# Patient Record
Sex: MALE | Race: OTHER | HISPANIC OR LATINO | URBAN - METROPOLITAN AREA
[De-identification: names, ages, dates, MRNs, and addresses within clinical notes are randomized per-mention and may not be internally consistent; named-entity substitution may affect disease eponyms.]

---

## 2023-04-29 ENCOUNTER — EMERGENCY (EMERGENCY)
Facility: HOSPITAL | Age: 43
LOS: 1 days | Discharge: HOME | End: 2023-04-29
Attending: STUDENT IN AN ORGANIZED HEALTH CARE EDUCATION/TRAINING PROGRAM
Payer: COMMERCIAL

## 2023-04-29 VITALS
SYSTOLIC BLOOD PRESSURE: 135 MMHG | OXYGEN SATURATION: 99 % | HEIGHT: 67 IN | WEIGHT: 176.37 LBS | HEART RATE: 82 BPM | RESPIRATION RATE: 18 BRPM | TEMPERATURE: 98 F | DIASTOLIC BLOOD PRESSURE: 93 MMHG

## 2023-04-29 LAB
BASOPHILS # BLD AUTO: 0.03 K/UL — SIGNIFICANT CHANGE UP (ref 0–0.2)
BASOPHILS NFR BLD AUTO: 0.5 % — SIGNIFICANT CHANGE UP (ref 0–2)
EOSINOPHIL # BLD AUTO: 0.15 K/UL — SIGNIFICANT CHANGE UP (ref 0–0.5)
EOSINOPHIL NFR BLD AUTO: 2.3 % — SIGNIFICANT CHANGE UP (ref 0–6)
HCT VFR BLD CALC: 40.3 % — SIGNIFICANT CHANGE UP (ref 39–50)
HGB BLD-MCNC: 14 G/DL — SIGNIFICANT CHANGE UP (ref 13–17)
IMM GRANULOCYTES NFR BLD AUTO: 0.6 % — SIGNIFICANT CHANGE UP (ref 0–0.9)
LYMPHOCYTES # BLD AUTO: 2.4 K/UL — SIGNIFICANT CHANGE UP (ref 1–3.3)
LYMPHOCYTES # BLD AUTO: 36.6 % — SIGNIFICANT CHANGE UP (ref 13–44)
MCHC RBC-ENTMCNC: 30.4 PG — SIGNIFICANT CHANGE UP (ref 27–34)
MCHC RBC-ENTMCNC: 34.7 GM/DL — SIGNIFICANT CHANGE UP (ref 32–36)
MCV RBC AUTO: 87.4 FL — SIGNIFICANT CHANGE UP (ref 80–100)
MONOCYTES # BLD AUTO: 0.56 K/UL — SIGNIFICANT CHANGE UP (ref 0–0.9)
MONOCYTES NFR BLD AUTO: 8.5 % — SIGNIFICANT CHANGE UP (ref 2–14)
NEUTROPHILS # BLD AUTO: 3.38 K/UL — SIGNIFICANT CHANGE UP (ref 1.8–7.4)
NEUTROPHILS NFR BLD AUTO: 51.5 % — SIGNIFICANT CHANGE UP (ref 43–77)
PLATELET # BLD AUTO: 358 K/UL — SIGNIFICANT CHANGE UP (ref 150–400)
RBC # BLD: 4.61 M/UL — SIGNIFICANT CHANGE UP (ref 4.2–5.8)
RBC # FLD: 12 % — SIGNIFICANT CHANGE UP (ref 10.3–14.5)
WBC # BLD: 6.56 K/UL — SIGNIFICANT CHANGE UP (ref 3.8–10.5)
WBC # FLD AUTO: 6.56 K/UL — SIGNIFICANT CHANGE UP (ref 3.8–10.5)

## 2023-04-29 PROCEDURE — 93010 ELECTROCARDIOGRAM REPORT: CPT

## 2023-04-29 PROCEDURE — 99284 EMERGENCY DEPT VISIT MOD MDM: CPT

## 2023-04-29 RX ORDER — SODIUM CHLORIDE 9 MG/ML
1000 INJECTION INTRAMUSCULAR; INTRAVENOUS; SUBCUTANEOUS ONCE
Refills: 0 | Status: COMPLETED | OUTPATIENT
Start: 2023-04-29 | End: 2023-04-29

## 2023-04-29 RX ORDER — FAMOTIDINE 10 MG/ML
20 INJECTION INTRAVENOUS ONCE
Refills: 0 | Status: COMPLETED | OUTPATIENT
Start: 2023-04-29 | End: 2023-04-29

## 2023-04-29 RX ADMIN — SODIUM CHLORIDE 1000 MILLILITER(S): 9 INJECTION INTRAMUSCULAR; INTRAVENOUS; SUBCUTANEOUS at 22:46

## 2023-04-29 RX ADMIN — Medication 30 MILLILITER(S): at 22:46

## 2023-04-29 RX ADMIN — FAMOTIDINE 20 MILLIGRAM(S): 10 INJECTION INTRAVENOUS at 22:46

## 2023-04-29 NOTE — ED ADULT NURSE NOTE - OBJECTIVE STATEMENT
Pt presents to ED c/o epigastric pain for past several days. Pt states pain radiates to abdomen and groin and comes on after eating. Pt c/o occasional nausea and diarrhea. Pt reports his grandfather passed away from stomach and wants to come in for further evaluation of this pain.

## 2023-04-29 NOTE — ED ADULT TRIAGE NOTE - CHIEF COMPLAINT QUOTE
c/o of epigastric pain that goes to his lower abd to groin x a few days, c/o of diarrhea and not eating anything it hurts

## 2023-04-29 NOTE — ED PROVIDER NOTE - PHYSICAL EXAMINATION
General-alert and oriented to person place and time, nontoxic appearing, pleasant cooperative, NAD  HEENT-normocephalic, atraumatic, NT to palp, EOMI, PERRLA, no conjunctival injections,   Cardio-s1,s2 present, regular rate and rhythm  Resp- talks in full sentences, symmetrical chest rise, CTA bilat, no evidence of wheezes, rhonchi noted  Abdomen- bowel sounds presnt in all 4 quadrants, soft, NT/ND, no guarding, no rebound tenderness  MSK- moves all extremities, able to ambulate without issues  Back- nt to palp of cervical, thoracic, lumbar spine, nt to palp of paraspinal m., No CVA tenderness  Neuro- no focal deficits, sensation intact

## 2023-04-29 NOTE — ED ADULT NURSE NOTE - CHIEF COMPLAINT
The patient is a 42y Male complaining of abdominal pain. Solaraze Pregnancy And Lactation Text: This medication is Pregnancy Category B and is considered safe. There is some data to suggest avoiding during the third trimester. It is unknown if this medication is excreted in breast milk.

## 2023-04-29 NOTE — ED PROVIDER NOTE - CARE PROVIDER_API CALL
Michelle Swann (DO)  Gastroenterology  [address], [address]  [place] [address]  Phone: [phone]  Fax: [phone]  Follow Up Time:

## 2023-04-29 NOTE — ED PROVIDER NOTE - PATIENT PORTAL LINK FT
You can access the FollowMyHealth Patient Portal offered by [facility] by registering at the following website: [email or web address]. By joining [facility]’s FollowMyHealth portal, you will also be able to view your health information using other applications (apps) compatible with our system.

## 2023-04-29 NOTE — ED PROVIDER NOTE - NS ED ATTENDING STATEMENT MOD
This was a shared visit with the COY. I reviewed and verified the documentation and independently performed the documented:

## 2023-04-29 NOTE — ED PROVIDER NOTE - OBJECTIVE STATEMENT
43 y/o male with no sign medical history presents to the ED c/o epigastric pain for the past 6 months worsening tonight. Pt notes after eating he would experience the epigastric pain. Notes worsening that he is afraid to eat due to the pain. Pain would radiate down his stomach at times. No nausea, vomiting. fevers, chills.

## 2023-04-29 NOTE — ED PROVIDER NOTE - CLINICAL SUMMARY MEDICAL DECISION MAKING FREE TEXT BOX
43 y/o male with no sign medical history presents to the ED c/o epigastric pain for the past 6 months worsening tonight. 43 y/o male with no sign medical history presents to the ED c/o epigastric pain for the past 6 months worsening tonight. labs stable, feeling better after meds, Pt reassessed, pt feeling better at this time, vss, pt able to walk, talk and vocalized plan of action. Discussed in depth and explained to pt in depth the next steps that need to be taking including proper follow up with PCP or specialists. All incidental findings were discussed with pt as well. Pt verbalized their concerns and all questions were answered. Pt understands dispo and wants discharge. Given good instructions when to return to ED and importance of f/u.

## 2023-04-30 LAB
ALBUMIN SERPL ELPH-MCNC: 4.5 G/DL — SIGNIFICANT CHANGE UP (ref 3.3–5.2)
ALP SERPL-CCNC: 91 U/L — SIGNIFICANT CHANGE UP (ref 40–120)
ALT FLD-CCNC: 14 U/L — SIGNIFICANT CHANGE UP
ANION GAP SERPL CALC-SCNC: 12 MMOL/L — SIGNIFICANT CHANGE UP (ref 5–17)
AST SERPL-CCNC: 18 U/L — SIGNIFICANT CHANGE UP
BILIRUB SERPL-MCNC: 0.5 MG/DL — SIGNIFICANT CHANGE UP (ref 0.4–2)
BUN SERPL-MCNC: 18.6 MG/DL — SIGNIFICANT CHANGE UP (ref 8–20)
CALCIUM SERPL-MCNC: 9.2 MG/DL — SIGNIFICANT CHANGE UP (ref 8.4–10.5)
CHLORIDE SERPL-SCNC: 104 MMOL/L — SIGNIFICANT CHANGE UP (ref 96–108)
CO2 SERPL-SCNC: 25 MMOL/L — SIGNIFICANT CHANGE UP (ref 22–29)
CREAT SERPL-MCNC: 1 MG/DL — SIGNIFICANT CHANGE UP (ref 0.5–1.3)
EGFR: 98 ML/MIN/1.73M2 — SIGNIFICANT CHANGE UP
GLUCOSE SERPL-MCNC: 78 MG/DL — SIGNIFICANT CHANGE UP (ref 70–99)
LIDOCAIN IGE QN: 37 U/L — SIGNIFICANT CHANGE UP (ref 22–51)
POTASSIUM SERPL-MCNC: 3.9 MMOL/L — SIGNIFICANT CHANGE UP (ref 3.5–5.3)
POTASSIUM SERPL-SCNC: 3.9 MMOL/L — SIGNIFICANT CHANGE UP (ref 3.5–5.3)
PROT SERPL-MCNC: 7.2 G/DL — SIGNIFICANT CHANGE UP (ref 6.6–8.7)
SODIUM SERPL-SCNC: 141 MMOL/L — SIGNIFICANT CHANGE UP (ref 135–145)

## 2023-04-30 PROCEDURE — 85025 COMPLETE CBC W/AUTO DIFF WBC: CPT

## 2023-04-30 PROCEDURE — 96361 HYDRATE IV INFUSION ADD-ON: CPT

## 2023-04-30 PROCEDURE — 99284 EMERGENCY DEPT VISIT MOD MDM: CPT | Mod: 25

## 2023-04-30 PROCEDURE — T1013: CPT

## 2023-04-30 PROCEDURE — 96374 THER/PROPH/DIAG INJ IV PUSH: CPT

## 2023-04-30 PROCEDURE — 93005 ELECTROCARDIOGRAM TRACING: CPT

## 2023-04-30 PROCEDURE — 80053 COMPREHEN METABOLIC PANEL: CPT

## 2023-04-30 PROCEDURE — 36415 COLL VENOUS BLD VENIPUNCTURE: CPT

## 2023-04-30 PROCEDURE — 83690 ASSAY OF LIPASE: CPT

## 2023-04-30 RX ORDER — PANTOPRAZOLE SODIUM 20 MG/1
1 TABLET, DELAYED RELEASE ORAL
Qty: 14 | Refills: 0
Start: 2023-04-30 | End: 2023-05-13

## 2024-06-12 NOTE — ED ADULT TRIAGE NOTE - GLASGOW COMA SCALE: SCORE, MLM
Talar Dome Lesion        What Is a Talar Dome Lesion?     The ankle joint is composed of the bottom of the tibia (shin) bone and the top of the talus (ankle) bone. The top of the talus is dome-shaped and is completely covered with cartilage--a tough, rubbery tissue that enables the ankle to move smoothly. A talar dome lesion is an injury to the cartilage and underlying bone of the talus within the ankle joint. It is also called an osteochondral defect (OCD) or osteochondral lesion of the talus (OLT). Osteo means bone and chondral refers to cartilage.    Talar dome lesions are usually caused by an injury, such as an ankle sprain. If the cartilage does not heal properly following the injury, it softens and begins to break off. Sometimes a broken piece of the damaged cartilage and bone will float in the ankle.    Signs & Symptoms  Unless the injury is extensive, it may take months, a year or even longer for symptoms to develop. The signs and symptoms of a talar dome lesion may include:    Chronic pain deep in the ankle--typically worse when bearing weight on the foot (especially during sports) and less when resting  An occasional clicking or catching feeling in the ankle when walking  A sensation of the ankle locking or giving out  Episodes of swelling of the ankle--occurring when bearing weight and subsiding when at rest     Diagnosis  A talar dome lesion can be difficult to diagnose because the precise site of the pain can be hard to pinpoint. To diagnose this injury, the foot and ankle surgeon will question the patient about recent or previous injury and will examine the foot and ankle, moving the ankle joint to help determine if there is pain, clicking or limited motion within that joint.    Sometimes the surgeon will inject the joint with an anesthetic (pain-relieving medication) to see if the pain goes away for a while, indicating that the pain is coming from inside the joint. X-rays are taken, and  often an MRI or other advanced imaging tests are ordered to further evaluate the lesion and extent of the injury.    Nonsurgical Treatment Approaches  Treatment depends on the severity of the talar dome lesion. If the lesion is stable (without loose pieces of cartilage or bone), one or more of the following nonsurgical treatment options may be considered:    Immobilization. Depending on the type of injury, the leg may be placed in a cast or cast boot to protect the talus. During this period of immobilization, nonweightbearing range-of-motion exercises may be recommended.  Oral medications. Nonsteroidal anti-inflammatory drugs (NSAIDs), such as ibuprofen, may be helpful in reducing the pain and inflammation.  Physical therapy. Range-of-motion and strengthening exercises are beneficial once the lesion is adequately healed. Physical therapy may also include techniques to reduce pain and swelling.  Ankle brace. Wearing an ankle brace may help protect the patient from reinjury if the ankle is unstable.     When Is Surgery Needed?  If nonsurgical treatment fails to relieve the symptoms of talar dome lesions, surgery may be necessary. Surgery may involve removal of the loose bone and cartilage fragments within the joint and establishing an environment for healing. A variety of surgical techniques is available to accomplish this. The surgeon will select the best procedure based on the specific case.    Complications of Talar Dome Lesions  Depending on the amount of damage to the cartilage in the ankle joint, arthritis may develop in the joint, resulting in chronic pain, swelling and limited joint motion. Treatment for these complications is best directed by a foot and ankle surgeon and may include one or more of the following:    Nonsteroidal or steroidal anti-inflammatory medications  Physical therapy  Bracing  Surgical intervention      Joint Pain in the Foot  The foot contains 26 bones and more than 30 joints. Many  people experience pain involving one or more of these joints. The pain may be accompanied by swelling, tenderness, stiffness, redness, bruising and/or increased warmth over the affected joints.    Joint pain may be caused by trauma, infection, inflammation, arthritis, bursitis, gout or structural foot problems. It is initially treated with rest, elevation and limitation of walking/weightbearing on the painful foot. Use of nonsteroidal anti-inflammatory drugs (NSAIDs), such as ibuprofen, and ice can help reduce local inflammation and pain. Custom orothotic devices may also be prescribed to support the foot and reduce pain. A foot and ankle surgeon can best determine the cause of joint pain and recommend the appropriate treatment.      Joint Swelling in the Foot  The foot contains 26 bones and more than 30 joints. The body's natural response to any type of joint injury is to increase blood flow to the affected area. This results in an accumulation of fluid in the tissues in and around the joint, resulting in swelling. Depending on the cause of the injury, joint swelling may be accompanied by stiffness, redness, warmth and pain.    Joint swelling and pain can also be referred to as capsulitis, bursitis or synovitis.    Joint swelling may also result from inflammatory, degenerative, traumatic, infective or crystal-forming joint diseases, such as gout. If joint swelling persists, a foot and ankle surgeon can best determine the cause and recommend the appropriate treatment.    Synovitis  Synovitis is an inflammation of the tissues that line a joint. It is commonly associated with specific diseases, such as arthritis or gout, but it may also be the result of overuse or trauma. Symptoms of synovitis may include redness, swelling, warmth and pain with joint motion.    Evaluation by a foot and ankle surgeon will help confirm the diagnosis and will help rule out other possible concerns, such as fractures or infections. The  surgeon may sample fluid from the joint to analyze for inflammatory cells or may order x-rays or other advanced imaging tests to better evaluate the affected joint.    Treatment for synovitis includes rest, ice, immobilization and oral nonsteroidal anti-inflammatory drugs (NSAIDs), such as ibuprofen, and may include steroid injections into the joint. Surgery may be indicated in longstanding cases.      What Is Capsulitis    Ligaments surrounding the joint form a capsule, which helps the joint to function properly. Capsulitis is a condition in which these ligaments have become inflamed. Capsulitis can also occur in the most any joint in the foot and ankle. This inflammation causes considerable discomfort and, if left untreated, can eventually lead to a weakening of surrounding ligaments that can cause dislocation.     Symptoms  Because capsulitis can be a progressive disorder and usually worsens if left untreated, early recognition and treatment are important. In the earlier stages--the best time to seek treatment--the symptoms may include:    Pain  Swelling in the area of pain  Difficulty wearing shoes  Pain when walking barefoot     In more advanced stages, the supportive ligaments weaken, leading to failure of the joint to stabilize. The unstable joint can lead to other problems, such as repetitive injury to the cartilage of a joint.    Diagnosis  In arriving at a diagnosis, the foot and ankle surgeon will examine the foot, press on it and maneuver it to reproduce the symptoms. The surgeon will also look for potential causes and test the stability of the joint. X-rays are usually ordered, and other imaging studies are sometimes needed.    Nonsurgical Treatment  The best time to treat capsulitis is during the early stages. At that time, nonsurgical approaches can be used to stabilize the joint, reduce the symptoms and address the underlying cause of the condition.    The foot and ankle surgeon may select one or  more of the following options for early treatment of capsulitis:    Rest and ice. Staying off the foot and applying ice packs help reduce the swelling and pain. Apply an ice pack, placing a thin towel between the ice and the skin. Use ice for 20 minutes and then wait at least 40 minutes before icing again.  Oral medications. Nonsteroidal anti-inflammatory drugs (NSAIDs), such as ibuprofen, may help relieve the pain and inflammation.  Taping/splinting. It may be necessary to tape the toe so that it will stay in the correct position. This helps relieve the pain and prevent further drifting of the toe.  Stretching. Stretching exercises may be prescribed for patients who have tight calf muscles.  Shoe modifications. Supportive shoes with stiff soles are recommended because they control the motion and lessen the amount of pressure on the ball of the foot.  Orthotic devices. Custom shoe inserts are often very beneficial. These include arch supports or a metatarsal pad that distributes the weight away from the joint.     When Is Surgery Needed?  Once the second toe starts moving toward the big toe, it will never go back to its normal position unless surgery is performed. The foot and ankle surgeon will select the procedure or combination of procedures best suited to the individual patient.    Nonsurgical Treatment  When you have joint inflammation or swelling, rehabilitation is crucial--and it starts the moment your treatment begins. Your foot and ankle surgeon may recommend one or more of the following treatment options:      -Rest. Stay off the injured ankle. Walking may cause further injury.  -Ice. Apply an ice pack to the injured area, placing a thin towel between the ice and the skin. Use ice for 20 minutes and then wait at least 40 minutes before icing again.  -Compression. An elastic wrap may be recommended to control swelling.  -Elevation. The ankle should be raised slightly above the level of your heart to reduce  swelling.  -Early physical therapy. Your doctor will start you on a rehabilitation program as soon as possible to promote healing and increase your range of motion. This includes doing prescribed exercises.  -Medications. Nonsteroidal anti-inflammatory drugs (NSAIDs), such as ibuprofen, may be recommended to reduce pain and inflammation. In some cases, prescription pain medications are needed to provide adequate relief.  -Immobilization. A period of limited or no weight bearing on the injured extremity.     When Is Surgery Needed?  In more severe cases, surgery may be required to adequately treat an injury to the foot or ankle. Surgery often involves repairing the damaged structures, ligament or ligaments. The foot and ankle surgeon will select the surgical procedure best suited for your case based on the type and severity of your injury as well as your activity level.    After surgery, rehabilitation is extremely important. Completing your rehabilitation program is crucial to a successful outcome. Be sure to continue to see your foot and ankle surgeon during this period to ensure that your ankle heals properly and function is restored.      R.I.C.E. Protocol      R.I.C.E. stands for Rest, Ice, Compression, and Elevation. Doing these things helps limit pain and swelling after an injury. R.I.C.E. also helps injuries heal faster. Use R.I.C.E. for sprains, strains, and severe bruises, joint pain/swelling, or foot/ankle inflammation. Follow the tips on this handout and begin R.I.C.E. as soon as possible after an injury.  []   Rest  Pain is your body's way of telling you to rest an injured area. Whether you have hurt an elbow, hand, foot, or knee, limiting its use will prevent further injury and help you heal.  []   Ice  Applying ice right after an injury helps prevent swelling and reduce pain. Don't place ice directly on your skin.  Wrap a cold pack or bag of ice in a thin cloth. Place it over the injured area.  Ice for  10 minutes every 3 hours. Don't ice for more than 20 minutes at a time.  []   Compression  Putting pressure (compression) on an injury helps prevent swelling and provides support.  Wrap the injured area firmly with an elastic bandage. If your hand or foot tingles, becomes discolored, or feels cold to the touch, the bandage may be too tight. Rewrap it more loosely.  If your bandage becomes too loose, rewrap it.  Do not wear an elastic bandage overnight.  []   Elevation  Keeping an injury elevated helps reduce swelling, pain, and throbbing. Elevation is most effective when the injury is kept elevated higher than the heart.     Call your healthcare provider if you notice any of the following:  Fingers or toes feel numb, are cold to the touch, or change color  Skin looks shiny or tight  Pain, swelling, or bruising worsens and is not improved with elevation      15